# Patient Record
Sex: FEMALE | Race: WHITE | NOT HISPANIC OR LATINO | URBAN - METROPOLITAN AREA
[De-identification: names, ages, dates, MRNs, and addresses within clinical notes are randomized per-mention and may not be internally consistent; named-entity substitution may affect disease eponyms.]

---

## 2017-01-12 ENCOUNTER — OUTPATIENT (OUTPATIENT)
Dept: OUTPATIENT SERVICES | Facility: HOSPITAL | Age: 77
LOS: 1 days | End: 2017-01-12
Payer: MEDICARE

## 2017-01-12 DIAGNOSIS — Z90.710 ACQUIRED ABSENCE OF BOTH CERVIX AND UTERUS: Chronic | ICD-10-CM

## 2017-01-12 DIAGNOSIS — Z90.49 ACQUIRED ABSENCE OF OTHER SPECIFIED PARTS OF DIGESTIVE TRACT: Chronic | ICD-10-CM

## 2017-01-12 DIAGNOSIS — I67.1 CEREBRAL ANEURYSM, NONRUPTURED: Chronic | ICD-10-CM

## 2017-01-12 DIAGNOSIS — M54.16 RADICULOPATHY, LUMBAR REGION: ICD-10-CM

## 2017-01-12 DIAGNOSIS — Z98.89 OTHER SPECIFIED POSTPROCEDURAL STATES: Chronic | ICD-10-CM

## 2017-01-12 PROCEDURE — 62323 NJX INTERLAMINAR LMBR/SAC: CPT

## 2017-01-12 PROCEDURE — 77003 FLUOROGUIDE FOR SPINE INJECT: CPT

## 2017-01-19 ENCOUNTER — APPOINTMENT (OUTPATIENT)
Dept: CT IMAGING | Facility: HOSPITAL | Age: 77
End: 2017-01-19

## 2017-01-19 ENCOUNTER — OUTPATIENT (OUTPATIENT)
Dept: OUTPATIENT SERVICES | Facility: HOSPITAL | Age: 77
LOS: 1 days | End: 2017-01-19
Payer: MEDICARE

## 2017-01-19 DIAGNOSIS — Z98.89 OTHER SPECIFIED POSTPROCEDURAL STATES: Chronic | ICD-10-CM

## 2017-01-19 DIAGNOSIS — I67.1 CEREBRAL ANEURYSM, NONRUPTURED: ICD-10-CM

## 2017-01-19 DIAGNOSIS — Z90.49 ACQUIRED ABSENCE OF OTHER SPECIFIED PARTS OF DIGESTIVE TRACT: Chronic | ICD-10-CM

## 2017-01-19 DIAGNOSIS — I67.1 CEREBRAL ANEURYSM, NONRUPTURED: Chronic | ICD-10-CM

## 2017-01-19 DIAGNOSIS — Z90.710 ACQUIRED ABSENCE OF BOTH CERVIX AND UTERUS: Chronic | ICD-10-CM

## 2017-01-19 PROCEDURE — 70496 CT ANGIOGRAPHY HEAD: CPT

## 2017-01-19 PROCEDURE — 70496 CT ANGIOGRAPHY HEAD: CPT | Mod: 26,53

## 2017-01-25 ENCOUNTER — APPOINTMENT (OUTPATIENT)
Dept: NEUROSURGERY | Facility: CLINIC | Age: 77
End: 2017-01-25

## 2017-01-26 ENCOUNTER — OUTPATIENT (OUTPATIENT)
Dept: OUTPATIENT SERVICES | Facility: HOSPITAL | Age: 77
LOS: 1 days | End: 2017-01-26
Payer: MEDICARE

## 2017-01-26 DIAGNOSIS — M54.16 RADICULOPATHY, LUMBAR REGION: ICD-10-CM

## 2017-01-26 DIAGNOSIS — Z98.89 OTHER SPECIFIED POSTPROCEDURAL STATES: Chronic | ICD-10-CM

## 2017-01-26 DIAGNOSIS — Z90.49 ACQUIRED ABSENCE OF OTHER SPECIFIED PARTS OF DIGESTIVE TRACT: Chronic | ICD-10-CM

## 2017-01-26 DIAGNOSIS — Z90.710 ACQUIRED ABSENCE OF BOTH CERVIX AND UTERUS: Chronic | ICD-10-CM

## 2017-01-26 DIAGNOSIS — I67.1 CEREBRAL ANEURYSM, NONRUPTURED: Chronic | ICD-10-CM

## 2017-01-26 PROCEDURE — 62323 NJX INTERLAMINAR LMBR/SAC: CPT

## 2017-01-26 PROCEDURE — 77003 FLUOROGUIDE FOR SPINE INJECT: CPT

## 2017-02-08 ENCOUNTER — APPOINTMENT (OUTPATIENT)
Dept: NEUROSURGERY | Facility: CLINIC | Age: 77
End: 2017-02-08

## 2017-02-08 VITALS
DIASTOLIC BLOOD PRESSURE: 87 MMHG | HEART RATE: 65 BPM | OXYGEN SATURATION: 99 % | SYSTOLIC BLOOD PRESSURE: 150 MMHG | HEIGHT: 65 IN | RESPIRATION RATE: 16 BRPM

## 2017-03-02 ENCOUNTER — OUTPATIENT (OUTPATIENT)
Dept: OUTPATIENT SERVICES | Facility: HOSPITAL | Age: 77
LOS: 1 days | End: 2017-03-02
Payer: MEDICARE

## 2017-03-02 DIAGNOSIS — Z90.710 ACQUIRED ABSENCE OF BOTH CERVIX AND UTERUS: Chronic | ICD-10-CM

## 2017-03-02 DIAGNOSIS — I67.1 CEREBRAL ANEURYSM, NONRUPTURED: Chronic | ICD-10-CM

## 2017-03-02 DIAGNOSIS — Z98.89 OTHER SPECIFIED POSTPROCEDURAL STATES: Chronic | ICD-10-CM

## 2017-03-02 DIAGNOSIS — M54.16 RADICULOPATHY, LUMBAR REGION: ICD-10-CM

## 2017-03-02 DIAGNOSIS — Z90.49 ACQUIRED ABSENCE OF OTHER SPECIFIED PARTS OF DIGESTIVE TRACT: Chronic | ICD-10-CM

## 2017-03-02 PROCEDURE — 62323 NJX INTERLAMINAR LMBR/SAC: CPT

## 2017-03-02 PROCEDURE — 77003 FLUOROGUIDE FOR SPINE INJECT: CPT

## 2018-02-04 ENCOUNTER — FORM ENCOUNTER (OUTPATIENT)
Age: 78
End: 2018-02-04

## 2018-02-05 ENCOUNTER — OUTPATIENT (OUTPATIENT)
Dept: OUTPATIENT SERVICES | Facility: HOSPITAL | Age: 78
LOS: 1 days | End: 2018-02-05
Payer: MEDICARE

## 2018-02-05 ENCOUNTER — APPOINTMENT (OUTPATIENT)
Dept: CT IMAGING | Facility: HOSPITAL | Age: 78
End: 2018-02-05

## 2018-02-05 DIAGNOSIS — Z98.89 OTHER SPECIFIED POSTPROCEDURAL STATES: Chronic | ICD-10-CM

## 2018-02-05 DIAGNOSIS — I67.1 CEREBRAL ANEURYSM, NONRUPTURED: ICD-10-CM

## 2018-02-05 DIAGNOSIS — I67.1 CEREBRAL ANEURYSM, NONRUPTURED: Chronic | ICD-10-CM

## 2018-02-05 DIAGNOSIS — G93.9 DISORDER OF BRAIN, UNSPECIFIED: ICD-10-CM

## 2018-02-05 DIAGNOSIS — Z90.49 ACQUIRED ABSENCE OF OTHER SPECIFIED PARTS OF DIGESTIVE TRACT: Chronic | ICD-10-CM

## 2018-02-05 DIAGNOSIS — Z90.710 ACQUIRED ABSENCE OF BOTH CERVIX AND UTERUS: Chronic | ICD-10-CM

## 2018-02-05 PROCEDURE — 70496 CT ANGIOGRAPHY HEAD: CPT | Mod: 26

## 2018-02-05 PROCEDURE — 70496 CT ANGIOGRAPHY HEAD: CPT

## 2018-02-06 LAB
BUN SERPL-MCNC: 33 MG/DL
CREAT SERPL-MCNC: 0.92 MG/DL

## 2018-03-26 ENCOUNTER — OUTPATIENT (OUTPATIENT)
Dept: OUTPATIENT SERVICES | Facility: HOSPITAL | Age: 78
LOS: 1 days | End: 2018-03-26
Payer: MEDICARE

## 2018-03-26 VITALS
SYSTOLIC BLOOD PRESSURE: 139 MMHG | HEART RATE: 63 BPM | TEMPERATURE: 98 F | DIASTOLIC BLOOD PRESSURE: 88 MMHG | RESPIRATION RATE: 14 BRPM | OXYGEN SATURATION: 98 % | HEIGHT: 64 IN | WEIGHT: 136.91 LBS

## 2018-03-26 DIAGNOSIS — Z98.89 OTHER SPECIFIED POSTPROCEDURAL STATES: Chronic | ICD-10-CM

## 2018-03-26 DIAGNOSIS — Z90.49 ACQUIRED ABSENCE OF OTHER SPECIFIED PARTS OF DIGESTIVE TRACT: Chronic | ICD-10-CM

## 2018-03-26 DIAGNOSIS — I67.1 CEREBRAL ANEURYSM, NONRUPTURED: Chronic | ICD-10-CM

## 2018-03-26 DIAGNOSIS — M19.90 UNSPECIFIED OSTEOARTHRITIS, UNSPECIFIED SITE: ICD-10-CM

## 2018-03-26 DIAGNOSIS — Z01.818 ENCOUNTER FOR OTHER PREPROCEDURAL EXAMINATION: ICD-10-CM

## 2018-03-26 DIAGNOSIS — Z90.710 ACQUIRED ABSENCE OF BOTH CERVIX AND UTERUS: Chronic | ICD-10-CM

## 2018-03-26 DIAGNOSIS — M17.11 UNILATERAL PRIMARY OSTEOARTHRITIS, RIGHT KNEE: ICD-10-CM

## 2018-03-26 LAB
ANION GAP SERPL CALC-SCNC: 14 MMOL/L — SIGNIFICANT CHANGE UP (ref 5–17)
BLD GP AB SCN SERPL QL: NEGATIVE — SIGNIFICANT CHANGE UP
BUN SERPL-MCNC: 29 MG/DL — HIGH (ref 7–23)
CALCIUM SERPL-MCNC: 9.9 MG/DL — SIGNIFICANT CHANGE UP (ref 8.4–10.5)
CHLORIDE SERPL-SCNC: 98 MMOL/L — SIGNIFICANT CHANGE UP (ref 96–108)
CO2 SERPL-SCNC: 28 MMOL/L — SIGNIFICANT CHANGE UP (ref 22–31)
CREAT SERPL-MCNC: 0.87 MG/DL — SIGNIFICANT CHANGE UP (ref 0.5–1.3)
GLUCOSE SERPL-MCNC: 118 MG/DL — HIGH (ref 70–99)
HCT VFR BLD CALC: 40.2 % — SIGNIFICANT CHANGE UP (ref 34.5–45)
HGB BLD-MCNC: 13.2 G/DL — SIGNIFICANT CHANGE UP (ref 11.5–15.5)
MCHC RBC-ENTMCNC: 31.5 PG — SIGNIFICANT CHANGE UP (ref 27–34)
MCHC RBC-ENTMCNC: 32.8 GM/DL — SIGNIFICANT CHANGE UP (ref 32–36)
MCV RBC AUTO: 95.9 FL — SIGNIFICANT CHANGE UP (ref 80–100)
MRSA PCR RESULT.: SIGNIFICANT CHANGE UP
NRBC # BLD: 0 /100 WBCS — SIGNIFICANT CHANGE UP (ref 0–0)
PLATELET # BLD AUTO: 291 K/UL — SIGNIFICANT CHANGE UP (ref 150–400)
POTASSIUM SERPL-MCNC: 4.1 MMOL/L — SIGNIFICANT CHANGE UP (ref 3.5–5.3)
POTASSIUM SERPL-SCNC: 4.1 MMOL/L — SIGNIFICANT CHANGE UP (ref 3.5–5.3)
RBC # BLD: 4.19 M/UL — SIGNIFICANT CHANGE UP (ref 3.8–5.2)
RBC # FLD: 14 % — SIGNIFICANT CHANGE UP (ref 10.3–14.5)
RH IG SCN BLD-IMP: POSITIVE — SIGNIFICANT CHANGE UP
S AUREUS DNA NOSE QL NAA+PROBE: SIGNIFICANT CHANGE UP
SODIUM SERPL-SCNC: 140 MMOL/L — SIGNIFICANT CHANGE UP (ref 135–145)
WBC # BLD: 6.92 K/UL — SIGNIFICANT CHANGE UP (ref 3.8–10.5)
WBC # FLD AUTO: 6.92 K/UL — SIGNIFICANT CHANGE UP (ref 3.8–10.5)

## 2018-03-26 PROCEDURE — G0463: CPT

## 2018-03-26 PROCEDURE — 85027 COMPLETE CBC AUTOMATED: CPT

## 2018-03-26 PROCEDURE — 87640 STAPH A DNA AMP PROBE: CPT

## 2018-03-26 PROCEDURE — 86901 BLOOD TYPING SEROLOGIC RH(D): CPT

## 2018-03-26 PROCEDURE — 87641 MR-STAPH DNA AMP PROBE: CPT

## 2018-03-26 PROCEDURE — 86850 RBC ANTIBODY SCREEN: CPT

## 2018-03-26 PROCEDURE — 86900 BLOOD TYPING SEROLOGIC ABO: CPT

## 2018-03-26 PROCEDURE — 80048 BASIC METABOLIC PNL TOTAL CA: CPT

## 2018-03-26 RX ORDER — GABAPENTIN 400 MG/1
100 CAPSULE ORAL ONCE
Qty: 0 | Refills: 0 | Status: COMPLETED | OUTPATIENT
Start: 2018-04-10 | End: 2018-04-10

## 2018-03-26 RX ORDER — PANTOPRAZOLE SODIUM 20 MG/1
40 TABLET, DELAYED RELEASE ORAL ONCE
Qty: 0 | Refills: 0 | Status: COMPLETED | OUTPATIENT
Start: 2018-04-10 | End: 2018-04-10

## 2018-03-26 RX ORDER — CEFAZOLIN SODIUM 1 G
2000 VIAL (EA) INJECTION ONCE
Qty: 0 | Refills: 0 | Status: DISCONTINUED | OUTPATIENT
Start: 2018-04-10 | End: 2018-04-13

## 2018-03-26 RX ORDER — ACETAMINOPHEN 500 MG
975 TABLET ORAL ONCE
Qty: 0 | Refills: 0 | Status: COMPLETED | OUTPATIENT
Start: 2018-04-10 | End: 2018-04-10

## 2018-03-26 RX ORDER — SODIUM CHLORIDE 9 MG/ML
3 INJECTION INTRAMUSCULAR; INTRAVENOUS; SUBCUTANEOUS EVERY 8 HOURS
Qty: 0 | Refills: 0 | Status: DISCONTINUED | OUTPATIENT
Start: 2018-04-10 | End: 2018-04-10

## 2018-03-26 RX ORDER — LOSARTAN/HYDROCHLOROTHIAZIDE 100MG-25MG
1 TABLET ORAL
Qty: 0 | Refills: 0 | COMMUNITY

## 2018-03-26 RX ORDER — LIDOCAINE HCL 20 MG/ML
0.2 VIAL (ML) INJECTION ONCE
Qty: 0 | Refills: 0 | Status: DISCONTINUED | OUTPATIENT
Start: 2018-04-10 | End: 2018-04-10

## 2018-03-26 RX ORDER — TRAMADOL HYDROCHLORIDE 50 MG/1
50 TABLET ORAL ONCE
Qty: 0 | Refills: 0 | Status: COMPLETED | OUTPATIENT
Start: 2018-04-10 | End: 2018-04-10

## 2018-03-26 NOTE — H&P PST ADULT - PSH
Cerebral aneurysm  11/1994 - left frontal temporal craniotomy and clipping of anterior communicating artery aneurysm  S/P appendectomy  1977  S/P cholecystectomy  14 years ago  S/P hysterectomy  1958 - menorrhagia

## 2018-03-26 NOTE — H&P PST ADULT - HISTORY OF PRESENT ILLNESS
77 yo female with h/o hypothyroidism and osteoarthritis of the right knee with c/o worsening right knee pain and difficulty walking. She is scheduled for right TKR on 4/10/2018.     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 CTA of brain 2/5/2018 revealed slight increase in A1A2 junction aneurysm, from 3x4mm to 6x4mm. Pt was instructed by neurosurgeon to follow up with repeat scan in six months (August 2018). Pt denies headache or visual disturbance.

## 2018-03-26 NOTE — H&P PST ADULT - PMH
Cerebral aneurysm    Hyperlipemia    Osteoarthritis Cerebral aneurysm  unruptured  Hyperlipemia    Hypothyroidism    Osteoarthritis

## 2018-03-26 NOTE — H&P PST ADULT - NSANTHOSAYNRD_GEN_A_CORE
No. GILDARDO screening performed.  STOP BANG Legend: 0-2 = LOW Risk; 3-4 = INTERMEDIATE Risk; 5-8 = HIGH Risk No. GILDARDO screening performed.  STOP BANG Legend: 0-2 = LOW Risk; 3-4 = INTERMEDIATE Risk; 5-8 = HIGH Risk/13.5 inches - neck

## 2018-04-10 ENCOUNTER — RESULT REVIEW (OUTPATIENT)
Age: 78
End: 2018-04-10

## 2018-04-10 ENCOUNTER — INPATIENT (INPATIENT)
Facility: HOSPITAL | Age: 78
LOS: 2 days | Discharge: NURSING, REHAB OR LONG-TERM CARE | DRG: 470 | End: 2018-04-13
Attending: ORTHOPAEDIC SURGERY | Admitting: ORTHOPAEDIC SURGERY
Payer: MEDICARE

## 2018-04-10 VITALS
DIASTOLIC BLOOD PRESSURE: 54 MMHG | HEART RATE: 59 BPM | SYSTOLIC BLOOD PRESSURE: 110 MMHG | OXYGEN SATURATION: 100 % | TEMPERATURE: 97 F | RESPIRATION RATE: 14 BRPM

## 2018-04-10 DIAGNOSIS — M17.11 UNILATERAL PRIMARY OSTEOARTHRITIS, RIGHT KNEE: ICD-10-CM

## 2018-04-10 DIAGNOSIS — Z90.710 ACQUIRED ABSENCE OF BOTH CERVIX AND UTERUS: Chronic | ICD-10-CM

## 2018-04-10 DIAGNOSIS — Z90.49 ACQUIRED ABSENCE OF OTHER SPECIFIED PARTS OF DIGESTIVE TRACT: Chronic | ICD-10-CM

## 2018-04-10 DIAGNOSIS — I67.1 CEREBRAL ANEURYSM, NONRUPTURED: Chronic | ICD-10-CM

## 2018-04-10 DIAGNOSIS — Z98.89 OTHER SPECIFIED POSTPROCEDURAL STATES: Chronic | ICD-10-CM

## 2018-04-10 LAB
ANION GAP SERPL CALC-SCNC: 12 MMOL/L — SIGNIFICANT CHANGE UP (ref 5–17)
BUN SERPL-MCNC: 29 MG/DL — HIGH (ref 7–23)
CALCIUM SERPL-MCNC: 8.7 MG/DL — SIGNIFICANT CHANGE UP (ref 8.4–10.5)
CHLORIDE SERPL-SCNC: 104 MMOL/L — SIGNIFICANT CHANGE UP (ref 96–108)
CO2 SERPL-SCNC: 27 MMOL/L — SIGNIFICANT CHANGE UP (ref 22–31)
CREAT SERPL-MCNC: 0.84 MG/DL — SIGNIFICANT CHANGE UP (ref 0.5–1.3)
GLUCOSE BLDC GLUCOMTR-MCNC: 101 MG/DL — HIGH (ref 70–99)
GLUCOSE SERPL-MCNC: 107 MG/DL — HIGH (ref 70–99)
HCT VFR BLD CALC: 35.9 % — SIGNIFICANT CHANGE UP (ref 34.5–45)
HGB BLD-MCNC: 12.3 G/DL — SIGNIFICANT CHANGE UP (ref 11.5–15.5)
MCHC RBC-ENTMCNC: 32.6 PG — SIGNIFICANT CHANGE UP (ref 27–34)
MCHC RBC-ENTMCNC: 34.3 GM/DL — SIGNIFICANT CHANGE UP (ref 32–36)
MCV RBC AUTO: 94.8 FL — SIGNIFICANT CHANGE UP (ref 80–100)
PLATELET # BLD AUTO: 248 K/UL — SIGNIFICANT CHANGE UP (ref 150–400)
POTASSIUM SERPL-MCNC: 3.9 MMOL/L — SIGNIFICANT CHANGE UP (ref 3.5–5.3)
POTASSIUM SERPL-SCNC: 3.9 MMOL/L — SIGNIFICANT CHANGE UP (ref 3.5–5.3)
RBC # BLD: 3.79 M/UL — LOW (ref 3.8–5.2)
RBC # FLD: 12.1 % — SIGNIFICANT CHANGE UP (ref 10.3–14.5)
SODIUM SERPL-SCNC: 143 MMOL/L — SIGNIFICANT CHANGE UP (ref 135–145)
WBC # BLD: 8.2 K/UL — SIGNIFICANT CHANGE UP (ref 3.8–10.5)
WBC # FLD AUTO: 8.2 K/UL — SIGNIFICANT CHANGE UP (ref 3.8–10.5)

## 2018-04-10 PROCEDURE — 88311 DECALCIFY TISSUE: CPT | Mod: 26

## 2018-04-10 PROCEDURE — 88305 TISSUE EXAM BY PATHOLOGIST: CPT | Mod: 26

## 2018-04-10 PROCEDURE — 73560 X-RAY EXAM OF KNEE 1 OR 2: CPT | Mod: 26,RT

## 2018-04-10 RX ORDER — OXYCODONE HYDROCHLORIDE 5 MG/1
10 TABLET ORAL EVERY 4 HOURS
Qty: 0 | Refills: 0 | Status: DISCONTINUED | OUTPATIENT
Start: 2018-04-10 | End: 2018-04-10

## 2018-04-10 RX ORDER — DOCUSATE SODIUM 100 MG
100 CAPSULE ORAL THREE TIMES A DAY
Qty: 0 | Refills: 0 | Status: DISCONTINUED | OUTPATIENT
Start: 2018-04-10 | End: 2018-04-10

## 2018-04-10 RX ORDER — LOSARTAN/HYDROCHLOROTHIAZIDE 100MG-25MG
1 TABLET ORAL
Qty: 0 | Refills: 0 | COMMUNITY

## 2018-04-10 RX ORDER — ACETAMINOPHEN 500 MG
1000 TABLET ORAL ONCE
Qty: 0 | Refills: 0 | Status: COMPLETED | OUTPATIENT
Start: 2018-04-10 | End: 2018-04-10

## 2018-04-10 RX ORDER — ASCORBIC ACID 60 MG
1 TABLET,CHEWABLE ORAL
Qty: 0 | Refills: 0 | COMMUNITY

## 2018-04-10 RX ORDER — SODIUM CHLORIDE 9 MG/ML
1000 INJECTION INTRAMUSCULAR; INTRAVENOUS; SUBCUTANEOUS ONCE
Qty: 0 | Refills: 0 | Status: COMPLETED | OUTPATIENT
Start: 2018-04-11 | End: 2018-04-11

## 2018-04-10 RX ORDER — THYROID 120 MG
1 TABLET ORAL
Qty: 0 | Refills: 0 | COMMUNITY

## 2018-04-10 RX ORDER — SENNA PLUS 8.6 MG/1
2 TABLET ORAL AT BEDTIME
Qty: 0 | Refills: 0 | Status: DISCONTINUED | OUTPATIENT
Start: 2018-04-10 | End: 2018-04-10

## 2018-04-10 RX ORDER — PANTOPRAZOLE SODIUM 20 MG/1
40 TABLET, DELAYED RELEASE ORAL DAILY
Qty: 0 | Refills: 0 | Status: DISCONTINUED | OUTPATIENT
Start: 2018-04-10 | End: 2018-04-13

## 2018-04-10 RX ORDER — DOCUSATE SODIUM 100 MG
100 CAPSULE ORAL THREE TIMES A DAY
Qty: 0 | Refills: 0 | Status: DISCONTINUED | OUTPATIENT
Start: 2018-04-10 | End: 2018-04-13

## 2018-04-10 RX ORDER — HYDROCHLOROTHIAZIDE 25 MG
12.5 TABLET ORAL DAILY
Qty: 0 | Refills: 0 | Status: DISCONTINUED | OUTPATIENT
Start: 2018-04-10 | End: 2018-04-13

## 2018-04-10 RX ORDER — MAGNESIUM HYDROXIDE 400 MG/1
30 TABLET, CHEWABLE ORAL DAILY
Qty: 0 | Refills: 0 | Status: DISCONTINUED | OUTPATIENT
Start: 2018-04-10 | End: 2018-04-10

## 2018-04-10 RX ORDER — ASPIRIN/CALCIUM CARB/MAGNESIUM 324 MG
325 TABLET ORAL
Qty: 0 | Refills: 0 | Status: DISCONTINUED | OUTPATIENT
Start: 2018-04-10 | End: 2018-04-10

## 2018-04-10 RX ORDER — GABAPENTIN 400 MG/1
100 CAPSULE ORAL EVERY 8 HOURS
Qty: 0 | Refills: 0 | Status: DISCONTINUED | OUTPATIENT
Start: 2018-04-10 | End: 2018-04-10

## 2018-04-10 RX ORDER — TRAMADOL HYDROCHLORIDE 50 MG/1
50 TABLET ORAL EVERY 8 HOURS
Qty: 0 | Refills: 0 | Status: DISCONTINUED | OUTPATIENT
Start: 2018-04-10 | End: 2018-04-13

## 2018-04-10 RX ORDER — SENNA PLUS 8.6 MG/1
2 TABLET ORAL AT BEDTIME
Qty: 0 | Refills: 0 | Status: DISCONTINUED | OUTPATIENT
Start: 2018-04-10 | End: 2018-04-13

## 2018-04-10 RX ORDER — FAMOTIDINE 10 MG/ML
20 INJECTION INTRAVENOUS EVERY 12 HOURS
Qty: 0 | Refills: 0 | Status: DISCONTINUED | OUTPATIENT
Start: 2018-04-10 | End: 2018-04-10

## 2018-04-10 RX ORDER — ONDANSETRON 8 MG/1
4 TABLET, FILM COATED ORAL EVERY 6 HOURS
Qty: 0 | Refills: 0 | Status: DISCONTINUED | OUTPATIENT
Start: 2018-04-10 | End: 2018-04-13

## 2018-04-10 RX ORDER — MORPHINE SULFATE 50 MG/1
4 CAPSULE, EXTENDED RELEASE ORAL ONCE
Qty: 0 | Refills: 0 | Status: DISCONTINUED | OUTPATIENT
Start: 2018-04-10 | End: 2018-04-10

## 2018-04-10 RX ORDER — MAGNESIUM HYDROXIDE 400 MG/1
30 TABLET, CHEWABLE ORAL DAILY
Qty: 0 | Refills: 0 | Status: DISCONTINUED | OUTPATIENT
Start: 2018-04-10 | End: 2018-04-13

## 2018-04-10 RX ORDER — LOSARTAN POTASSIUM 100 MG/1
50 TABLET, FILM COATED ORAL DAILY
Qty: 0 | Refills: 0 | Status: DISCONTINUED | OUTPATIENT
Start: 2018-04-10 | End: 2018-04-13

## 2018-04-10 RX ORDER — THYROID 120 MG
30 TABLET ORAL DAILY
Qty: 0 | Refills: 0 | Status: DISCONTINUED | OUTPATIENT
Start: 2018-04-10 | End: 2018-04-13

## 2018-04-10 RX ORDER — GABAPENTIN 400 MG/1
100 CAPSULE ORAL EVERY 8 HOURS
Qty: 0 | Refills: 0 | Status: DISCONTINUED | OUTPATIENT
Start: 2018-04-10 | End: 2018-04-13

## 2018-04-10 RX ORDER — SODIUM CHLORIDE 9 MG/ML
1000 INJECTION, SOLUTION INTRAVENOUS
Qty: 0 | Refills: 0 | Status: DISCONTINUED | OUTPATIENT
Start: 2018-04-10 | End: 2018-04-10

## 2018-04-10 RX ORDER — OXYCODONE HYDROCHLORIDE 5 MG/1
10 TABLET ORAL EVERY 4 HOURS
Qty: 0 | Refills: 0 | Status: DISCONTINUED | OUTPATIENT
Start: 2018-04-10 | End: 2018-04-13

## 2018-04-10 RX ORDER — SODIUM CHLORIDE 9 MG/ML
1000 INJECTION, SOLUTION INTRAVENOUS
Qty: 0 | Refills: 0 | Status: DISCONTINUED | OUTPATIENT
Start: 2018-04-10 | End: 2018-04-13

## 2018-04-10 RX ORDER — OXYCODONE HYDROCHLORIDE 5 MG/1
5 TABLET ORAL EVERY 4 HOURS
Qty: 0 | Refills: 0 | Status: DISCONTINUED | OUTPATIENT
Start: 2018-04-10 | End: 2018-04-13

## 2018-04-10 RX ORDER — ONDANSETRON 8 MG/1
4 TABLET, FILM COATED ORAL EVERY 6 HOURS
Qty: 0 | Refills: 0 | Status: DISCONTINUED | OUTPATIENT
Start: 2018-04-10 | End: 2018-04-10

## 2018-04-10 RX ORDER — CEFAZOLIN SODIUM 1 G
2000 VIAL (EA) INJECTION EVERY 8 HOURS
Qty: 0 | Refills: 0 | Status: DISCONTINUED | OUTPATIENT
Start: 2018-04-10 | End: 2018-04-10

## 2018-04-10 RX ORDER — KETOROLAC TROMETHAMINE 30 MG/ML
15 SYRINGE (ML) INJECTION EVERY 8 HOURS
Qty: 0 | Refills: 0 | Status: DISCONTINUED | OUTPATIENT
Start: 2018-04-10 | End: 2018-04-10

## 2018-04-10 RX ORDER — OXYCODONE HYDROCHLORIDE 5 MG/1
5 TABLET ORAL EVERY 4 HOURS
Qty: 0 | Refills: 0 | Status: DISCONTINUED | OUTPATIENT
Start: 2018-04-10 | End: 2018-04-10

## 2018-04-10 RX ORDER — POLYETHYLENE GLYCOL 3350 17 G/17G
17 POWDER, FOR SOLUTION ORAL DAILY
Qty: 0 | Refills: 0 | Status: DISCONTINUED | OUTPATIENT
Start: 2018-04-10 | End: 2018-04-10

## 2018-04-10 RX ORDER — ACETAMINOPHEN 500 MG
650 TABLET ORAL EVERY 6 HOURS
Qty: 0 | Refills: 0 | Status: DISCONTINUED | OUTPATIENT
Start: 2018-04-10 | End: 2018-04-13

## 2018-04-10 RX ORDER — TRAMADOL HYDROCHLORIDE 50 MG/1
50 TABLET ORAL EVERY 8 HOURS
Qty: 0 | Refills: 0 | Status: DISCONTINUED | OUTPATIENT
Start: 2018-04-10 | End: 2018-04-10

## 2018-04-10 RX ORDER — MORPHINE SULFATE 50 MG/1
4 CAPSULE, EXTENDED RELEASE ORAL ONCE
Qty: 0 | Refills: 0 | Status: DISCONTINUED | OUTPATIENT
Start: 2018-04-10 | End: 2018-04-13

## 2018-04-10 RX ORDER — HYDROMORPHONE HYDROCHLORIDE 2 MG/ML
0.25 INJECTION INTRAMUSCULAR; INTRAVENOUS; SUBCUTANEOUS
Qty: 0 | Refills: 0 | Status: DISCONTINUED | OUTPATIENT
Start: 2018-04-10 | End: 2018-04-10

## 2018-04-10 RX ORDER — ACETAMINOPHEN 500 MG
650 TABLET ORAL EVERY 6 HOURS
Qty: 0 | Refills: 0 | Status: DISCONTINUED | OUTPATIENT
Start: 2018-04-10 | End: 2018-04-10

## 2018-04-10 RX ORDER — ASPIRIN/CALCIUM CARB/MAGNESIUM 324 MG
325 TABLET ORAL
Qty: 0 | Refills: 0 | Status: DISCONTINUED | OUTPATIENT
Start: 2018-04-10 | End: 2018-04-13

## 2018-04-10 RX ORDER — CEFAZOLIN SODIUM 1 G
2000 VIAL (EA) INJECTION EVERY 8 HOURS
Qty: 0 | Refills: 0 | Status: COMPLETED | OUTPATIENT
Start: 2018-04-10 | End: 2018-04-10

## 2018-04-10 RX ORDER — POLYETHYLENE GLYCOL 3350 17 G/17G
17 POWDER, FOR SOLUTION ORAL DAILY
Qty: 0 | Refills: 0 | Status: DISCONTINUED | OUTPATIENT
Start: 2018-04-10 | End: 2018-04-13

## 2018-04-10 RX ORDER — SODIUM CHLORIDE 9 MG/ML
500 INJECTION INTRAMUSCULAR; INTRAVENOUS; SUBCUTANEOUS ONCE
Qty: 0 | Refills: 0 | Status: COMPLETED | OUTPATIENT
Start: 2018-04-10 | End: 2018-04-10

## 2018-04-10 RX ORDER — SODIUM CHLORIDE 9 MG/ML
1000 INJECTION INTRAMUSCULAR; INTRAVENOUS; SUBCUTANEOUS ONCE
Qty: 0 | Refills: 0 | Status: COMPLETED | OUTPATIENT
Start: 2018-04-10 | End: 2018-04-10

## 2018-04-10 RX ADMIN — PANTOPRAZOLE SODIUM 40 MILLIGRAM(S): 20 TABLET, DELAYED RELEASE ORAL at 07:31

## 2018-04-10 RX ADMIN — GABAPENTIN 100 MILLIGRAM(S): 400 CAPSULE ORAL at 22:41

## 2018-04-10 RX ADMIN — Medication 975 MILLIGRAM(S): at 07:31

## 2018-04-10 RX ADMIN — Medication 100 MILLIGRAM(S): at 22:41

## 2018-04-10 RX ADMIN — Medication 325 MILLIGRAM(S): at 17:49

## 2018-04-10 RX ADMIN — ONDANSETRON 4 MILLIGRAM(S): 8 TABLET, FILM COATED ORAL at 18:35

## 2018-04-10 RX ADMIN — GABAPENTIN 100 MILLIGRAM(S): 400 CAPSULE ORAL at 07:31

## 2018-04-10 RX ADMIN — Medication 1000 MILLIGRAM(S): at 19:15

## 2018-04-10 RX ADMIN — Medication 400 MILLIGRAM(S): at 18:55

## 2018-04-10 RX ADMIN — Medication 100 MILLIGRAM(S): at 16:08

## 2018-04-10 RX ADMIN — TRAMADOL HYDROCHLORIDE 50 MILLIGRAM(S): 50 TABLET ORAL at 22:44

## 2018-04-10 RX ADMIN — HYDROMORPHONE HYDROCHLORIDE 0.25 MILLIGRAM(S): 2 INJECTION INTRAMUSCULAR; INTRAVENOUS; SUBCUTANEOUS at 15:21

## 2018-04-10 RX ADMIN — SODIUM CHLORIDE 1000 MILLILITER(S): 9 INJECTION INTRAMUSCULAR; INTRAVENOUS; SUBCUTANEOUS at 20:02

## 2018-04-10 RX ADMIN — SODIUM CHLORIDE 75 MILLILITER(S): 9 INJECTION, SOLUTION INTRAVENOUS at 12:11

## 2018-04-10 RX ADMIN — SODIUM CHLORIDE 2000 MILLILITER(S): 9 INJECTION INTRAMUSCULAR; INTRAVENOUS; SUBCUTANEOUS at 16:56

## 2018-04-10 RX ADMIN — TRAMADOL HYDROCHLORIDE 50 MILLIGRAM(S): 50 TABLET ORAL at 23:15

## 2018-04-10 RX ADMIN — HYDROMORPHONE HYDROCHLORIDE 0.25 MILLIGRAM(S): 2 INJECTION INTRAMUSCULAR; INTRAVENOUS; SUBCUTANEOUS at 16:11

## 2018-04-10 NOTE — BRIEF OPERATIVE NOTE - PROCEDURE
<<-----Click on this checkbox to enter Procedure Total knee arthroplasty  04/10/2018  R TKA  Active  [id number]

## 2018-04-10 NOTE — PHYSICAL THERAPY INITIAL EVALUATION ADULT - ADDITIONAL COMMENTS
Pt lives in a  alone, pt reports she will be staying with her daughter temporarily after surgery. +3 steps to enter with HR. All needs met on main floor. Pt reports she was ambulating independently without use of an AD and was independent with ADL's prior. Pt lives in a  alone, pt reports she will be staying with her daughter temporarily after surgery. +3 steps to enter with no HR. All needs met on main floor. Pt reports she was ambulating independently without use of an AD and was independent with ADL's prior.

## 2018-04-10 NOTE — PHYSICAL THERAPY INITIAL EVALUATION ADULT - PRECAUTIONS/LIMITATIONS, REHAB EVAL
fall precautions/surgical precautions fall precautions/surgical precautions/which is followed annually with imaging. CTA of brain 2/5/2018 revealed slight increase in A1A2 junction aneurysm, from 3x4mm to 6x4mm. Pt was instructed by neurosurgeon to follow up with repeat scan in six months (August 2018).

## 2018-04-10 NOTE — CHART NOTE - NSCHARTNOTEFT_GEN_A_CORE
Resting without complaints.  No Chest Pain, SOB, N/V.    T(C): 36 (04-10-18 @ 11:35), Max: 36 (04-10-18 @ 11:35)  HR: 54 (04-10-18 @ 12:30) (54 - 61)  BP: 115/59 (04-10-18 @ 12:30) (101/52 - 115/59)  RR: 15 (04-10-18 @ 12:30) (13 - 15)  SpO2: 100% (04-10-18 @ 12:30) (100% - 100%)  Wt(kg): --    Exam:  Alert and [place], No Acute Distress  Card: +S1/S2, RRR  Pulm: CTAB  RLE dsg c/d/i with soft/compressible compartments  Calves soft, non-tender bilaterally  Limited motor 2/2 spinal  SILT, decreased in foot 2/2 spinal  + DP pulse    Xray: Intact R knee hw                          12.3   8.2   )-----------( 248      ( 10 Apr 2018 12:09 )             35.9    04-10    143  |  104  |  29<H>  ----------------------------<  107<H>  3.9   |  27  |  0.84    Ca    8.7      10 Apr 2018 12:09        A/P: 78yFemale S/p R Total Knee Arthroplasty. VSS. NAD.  Limited exam 2/2 spinal block  -PT/OT-WBAT   -re-eval when block d/c'd  -IS  -DVT PPx  -Pain Control  -Continue Current Tx    Isaiah Car PA-C  Team Pager #[phone]

## 2018-04-10 NOTE — PHYSICAL THERAPY INITIAL EVALUATION ADULT - TRANSFER TRAINING, PT EVAL
GOAL: Pt will perform ALL transfers independently, w/use of appropriate assistive device as needed, in 2 weeks.

## 2018-04-10 NOTE — PHYSICAL THERAPY INITIAL EVALUATION ADULT - ACTIVE RANGE OF MOTION EXAMINATION, REHAB EVAL
no Active ROM deficits were identified/Left LE Active ROM was WFL (within functional limits)/R LE AROM limited 2/2 pain

## 2018-04-10 NOTE — PHYSICAL THERAPY INITIAL EVALUATION ADULT - STRENGTHENING, PT EVAL
GOAL: Pt will improve R LE strength to 4/5, for increased limb stability, to improve gait and facilitate stair negotiation in 2 weeks.

## 2018-04-10 NOTE — PHYSICAL THERAPY INITIAL EVALUATION ADULT - PERTINENT HX OF CURRENT PROBLEM, REHAB EVAL
77 yo female with h/o hypothyroidism and osteoarthritis of the right knee with c/o worsening right knee pain and difficulty walking. Pt is now s/p right TKR on 4/10/2018. Pt with h/o unruptured acomm artery aneurysm s/p clipping 1994 and distal left A1A2 junction aneurysm,

## 2018-04-10 NOTE — PHYSICAL THERAPY INITIAL EVALUATION ADULT - BALANCE TRAINING, PT EVAL
GOAL: Pt will demonstrate improved static/dynamic standing balance to good, in order to improve stability, decrease fall risk and increase independence with ADLs within 4 weeks.

## 2018-04-10 NOTE — PHYSICAL THERAPY INITIAL EVALUATION ADULT - PLANNED THERAPY INTERVENTIONS, PT EVAL
strengthening/transfer training/balance training/stair negotiation: GOAL pt will ascend/descend 5 steps independently with use of straight cane and no handrail with step to pattern in 2 weeks/bed mobility training/gait training

## 2018-04-11 LAB
ANION GAP SERPL CALC-SCNC: 10 MMOL/L — SIGNIFICANT CHANGE UP (ref 5–17)
BUN SERPL-MCNC: 28 MG/DL — HIGH (ref 7–23)
CALCIUM SERPL-MCNC: 8.6 MG/DL — SIGNIFICANT CHANGE UP (ref 8.4–10.5)
CHLORIDE SERPL-SCNC: 102 MMOL/L — SIGNIFICANT CHANGE UP (ref 96–108)
CO2 SERPL-SCNC: 26 MMOL/L — SIGNIFICANT CHANGE UP (ref 22–31)
CREAT SERPL-MCNC: 0.75 MG/DL — SIGNIFICANT CHANGE UP (ref 0.5–1.3)
GLUCOSE SERPL-MCNC: 121 MG/DL — HIGH (ref 70–99)
HCT VFR BLD CALC: 34.1 % — LOW (ref 34.5–45)
HGB BLD-MCNC: 11.1 G/DL — LOW (ref 11.5–15.5)
MCHC RBC-ENTMCNC: 31.5 PG — SIGNIFICANT CHANGE UP (ref 27–34)
MCHC RBC-ENTMCNC: 32.6 GM/DL — SIGNIFICANT CHANGE UP (ref 32–36)
MCV RBC AUTO: 96.9 FL — SIGNIFICANT CHANGE UP (ref 80–100)
PLATELET # BLD AUTO: 228 K/UL — SIGNIFICANT CHANGE UP (ref 150–400)
POTASSIUM SERPL-MCNC: 3.6 MMOL/L — SIGNIFICANT CHANGE UP (ref 3.5–5.3)
POTASSIUM SERPL-SCNC: 3.6 MMOL/L — SIGNIFICANT CHANGE UP (ref 3.5–5.3)
RBC # BLD: 3.52 M/UL — LOW (ref 3.8–5.2)
RBC # FLD: 14.2 % — SIGNIFICANT CHANGE UP (ref 10.3–14.5)
SODIUM SERPL-SCNC: 138 MMOL/L — SIGNIFICANT CHANGE UP (ref 135–145)
WBC # BLD: 9.27 K/UL — SIGNIFICANT CHANGE UP (ref 3.8–10.5)
WBC # FLD AUTO: 9.27 K/UL — SIGNIFICANT CHANGE UP (ref 3.8–10.5)

## 2018-04-11 RX ADMIN — GABAPENTIN 100 MILLIGRAM(S): 400 CAPSULE ORAL at 06:09

## 2018-04-11 RX ADMIN — PANTOPRAZOLE SODIUM 40 MILLIGRAM(S): 20 TABLET, DELAYED RELEASE ORAL at 12:35

## 2018-04-11 RX ADMIN — ONDANSETRON 4 MILLIGRAM(S): 8 TABLET, FILM COATED ORAL at 00:50

## 2018-04-11 RX ADMIN — Medication 100 MILLIGRAM(S): at 14:24

## 2018-04-11 RX ADMIN — SODIUM CHLORIDE 2000 MILLILITER(S): 9 INJECTION INTRAMUSCULAR; INTRAVENOUS; SUBCUTANEOUS at 06:10

## 2018-04-11 RX ADMIN — Medication 100 MILLIGRAM(S): at 21:32

## 2018-04-11 RX ADMIN — TRAMADOL HYDROCHLORIDE 50 MILLIGRAM(S): 50 TABLET ORAL at 14:50

## 2018-04-11 RX ADMIN — GABAPENTIN 100 MILLIGRAM(S): 400 CAPSULE ORAL at 14:24

## 2018-04-11 RX ADMIN — TRAMADOL HYDROCHLORIDE 50 MILLIGRAM(S): 50 TABLET ORAL at 06:09

## 2018-04-11 RX ADMIN — TRAMADOL HYDROCHLORIDE 50 MILLIGRAM(S): 50 TABLET ORAL at 22:00

## 2018-04-11 RX ADMIN — LOSARTAN POTASSIUM 50 MILLIGRAM(S): 100 TABLET, FILM COATED ORAL at 06:09

## 2018-04-11 RX ADMIN — OXYCODONE HYDROCHLORIDE 10 MILLIGRAM(S): 5 TABLET ORAL at 09:35

## 2018-04-11 RX ADMIN — TRAMADOL HYDROCHLORIDE 50 MILLIGRAM(S): 50 TABLET ORAL at 21:32

## 2018-04-11 RX ADMIN — Medication 325 MILLIGRAM(S): at 18:09

## 2018-04-11 RX ADMIN — GABAPENTIN 100 MILLIGRAM(S): 400 CAPSULE ORAL at 21:32

## 2018-04-11 RX ADMIN — Medication 12.5 MILLIGRAM(S): at 06:09

## 2018-04-11 RX ADMIN — OXYCODONE HYDROCHLORIDE 10 MILLIGRAM(S): 5 TABLET ORAL at 09:05

## 2018-04-11 RX ADMIN — TRAMADOL HYDROCHLORIDE 50 MILLIGRAM(S): 50 TABLET ORAL at 14:24

## 2018-04-11 RX ADMIN — Medication 325 MILLIGRAM(S): at 06:09

## 2018-04-11 RX ADMIN — ONDANSETRON 4 MILLIGRAM(S): 8 TABLET, FILM COATED ORAL at 06:41

## 2018-04-11 RX ADMIN — Medication 100 MILLIGRAM(S): at 06:09

## 2018-04-11 RX ADMIN — POLYETHYLENE GLYCOL 3350 17 GRAM(S): 17 POWDER, FOR SOLUTION ORAL at 12:35

## 2018-04-11 RX ADMIN — Medication 30 MILLIGRAM(S): at 08:09

## 2018-04-11 NOTE — OCCUPATIONAL THERAPY INITIAL EVALUATION ADULT - PERTINENT HX OF CURRENT PROBLEM, REHAB EVAL
77 yo female with h/o hypothyroidism and osteoarthritis of the right knee with c/o worsening right knee pain and difficulty walking. She is scheduled for right TKR on 4/10/2018.

## 2018-04-11 NOTE — PROGRESS NOTE ADULT - SUBJECTIVE AND OBJECTIVE BOX
ORTHO PROGRESS NOTE     Pt seen and examined at bedside, denies SOB, CP, Dizziness. N/V/D /HA.  No significant overnight events. Pain well controlled. Patient ambulated  with PT     Vital Signs Last 24 Hrs  T(C): 36.7 (11 Apr 2018 05:51), Max: 36.7 (11 Apr 2018 05:51)  T(F): 98 (11 Apr 2018 05:51), Max: 98 (11 Apr 2018 05:51)  HR: 64 (11 Apr 2018 05:51) (53 - 74)  BP: 122/71 (11 Apr 2018 05:51) (101/52 - 137/70)  BP(mean): 84 (10 Apr 2018 16:00) (0 - 84)  RR: 18 (11 Apr 2018 05:51) (13 - 18)  SpO2: 99% (11 Apr 2018 05:51) (97% - 100%)    Gen: No apparent distress, alert    RLE:  Dressing C/D/I;           +DF/PF. moving toes          SILT, wwp at foot          compartments soft    Labs:  CBC Full  -  ( 10 Apr 2018 12:09 )  WBC Count : 8.2 K/uL  Hemoglobin : 12.3 g/dL  Hematocrit : 35.9 %  Platelet Count - Automated : 248 K/uL  Mean Cell Volume : 94.8 fl  Mean Cell Hemoglobin : 32.6 pg  Mean Cell Hemoglobin Concentration : 34.3 gm/dL  Auto Neutrophil # : x  Auto Lymphocyte # : x  Auto Monocyte # : x  Auto Eosinophil # : x  Auto Basophil # : x  Auto Neutrophil % : x  Auto Lymphocyte % : x  Auto Monocyte % : x  Auto Eosinophil % : x  Auto Basophil % : x      04-10    143  |  104  |  29<H>  ----------------------------<  107<H>  3.9   |  27  |  0.84    Ca    8.7      10 Apr 2018 12:09        A/P  Pt is a 78y yo Female s/p R  Knee Arthroplasty    - Pain control/ Analgesia  - DVT ppx  - PT/OT - wbat/oob    - WBAT

## 2018-04-11 NOTE — OCCUPATIONAL THERAPY INITIAL EVALUATION ADULT - DIAGNOSIS, OT EVAL
Decreased balance, strength, endurance, increased pain/confusion impacting ability to perform ADLs and functional mobility.

## 2018-04-11 NOTE — OCCUPATIONAL THERAPY INITIAL EVALUATION ADULT - TRANSFER SAFETY CONCERNS NOTED: BED/CHAIR, REHAB EVAL
decreased weight-shifting ability/decreased balance during turns/decreased safety awareness/losing balance

## 2018-04-11 NOTE — OCCUPATIONAL THERAPY INITIAL EVALUATION ADULT - TRANSFER TRAINING, PT EVAL
Patient will perform sit to stand independently within two weeks. Patient will be independent with functional transfer with use of rolling walker  in two weeks

## 2018-04-11 NOTE — OCCUPATIONAL THERAPY INITIAL EVALUATION ADULT - ADL RETRAINING, OT EVAL
Pt will perform LB dressing independently in two weeks. Pt will be independent with toileting in two weeks

## 2018-04-11 NOTE — PROVIDER CONTACT NOTE (OTHER) - ASSESSMENT
Pt denies allergies, no new medications for foods given today. Pt denies chest pain, SOB, difficulty breathing. No rash noted elsewhere on body. Rash flat, patchy. Pt states she hasn't noticed a rash like this before, and family agrees

## 2018-04-11 NOTE — PROGRESS NOTE ADULT - SUBJECTIVE AND OBJECTIVE BOX
YVONNE GUZMAN  [id number]  04-11-18 @ 08:11      S:s/p TKR. The patient reports pain control is fair.    O: The patient is alert. Examination shows alignment looks good, sensation is intact, motor function is intact, DPP is 2+. There is no calf pain.     A: The patient is doing well without complications.    P:  The patient will have physical therapy and will be discharged when cleared by P.T.     Panchito Denise M.D.

## 2018-04-11 NOTE — OCCUPATIONAL THERAPY INITIAL EVALUATION ADULT - LIVES WITH, PROFILE
Pt lives alone in private house, with 2 steps to enter bed/bath on 1st floor +walk in shower. Pt can return home with daughter 3 steps to enter +handrail/alone

## 2018-04-11 NOTE — PROVIDER CONTACT NOTE (OTHER) - ACTION/TREATMENT ORDERED:
Cold packs applied. Tao PAK at bedside for assessment. No further interventions at this time. Will cont to monitor.

## 2018-04-12 ENCOUNTER — TRANSCRIPTION ENCOUNTER (OUTPATIENT)
Age: 78
End: 2018-04-12

## 2018-04-12 LAB
ANION GAP SERPL CALC-SCNC: 9 MMOL/L — SIGNIFICANT CHANGE UP (ref 5–17)
BUN SERPL-MCNC: 16 MG/DL — SIGNIFICANT CHANGE UP (ref 7–23)
CALCIUM SERPL-MCNC: 8.6 MG/DL — SIGNIFICANT CHANGE UP (ref 8.4–10.5)
CHLORIDE SERPL-SCNC: 97 MMOL/L — SIGNIFICANT CHANGE UP (ref 96–108)
CO2 SERPL-SCNC: 29 MMOL/L — SIGNIFICANT CHANGE UP (ref 22–31)
CREAT SERPL-MCNC: 0.76 MG/DL — SIGNIFICANT CHANGE UP (ref 0.5–1.3)
GLUCOSE SERPL-MCNC: 108 MG/DL — HIGH (ref 70–99)
HCT VFR BLD CALC: 36 % — SIGNIFICANT CHANGE UP (ref 34.5–45)
HGB BLD-MCNC: 11.9 G/DL — SIGNIFICANT CHANGE UP (ref 11.5–15.5)
MCHC RBC-ENTMCNC: 31.4 PG — SIGNIFICANT CHANGE UP (ref 27–34)
MCHC RBC-ENTMCNC: 33.1 GM/DL — SIGNIFICANT CHANGE UP (ref 32–36)
MCV RBC AUTO: 95 FL — SIGNIFICANT CHANGE UP (ref 80–100)
PLATELET # BLD AUTO: 224 K/UL — SIGNIFICANT CHANGE UP (ref 150–400)
POTASSIUM SERPL-MCNC: 3.3 MMOL/L — LOW (ref 3.5–5.3)
POTASSIUM SERPL-SCNC: 3.3 MMOL/L — LOW (ref 3.5–5.3)
RBC # BLD: 3.79 M/UL — LOW (ref 3.8–5.2)
RBC # FLD: 13.8 % — SIGNIFICANT CHANGE UP (ref 10.3–14.5)
SODIUM SERPL-SCNC: 135 MMOL/L — SIGNIFICANT CHANGE UP (ref 135–145)
SURGICAL PATHOLOGY STUDY: SIGNIFICANT CHANGE UP
WBC # BLD: 9.6 K/UL — SIGNIFICANT CHANGE UP (ref 3.8–10.5)
WBC # FLD AUTO: 9.6 K/UL — SIGNIFICANT CHANGE UP (ref 3.8–10.5)

## 2018-04-12 RX ORDER — TRAMADOL HYDROCHLORIDE 50 MG/1
1 TABLET ORAL
Qty: 0 | Refills: 0 | COMMUNITY
Start: 2018-04-12

## 2018-04-12 RX ORDER — POLYETHYLENE GLYCOL 3350 17 G/17G
17 POWDER, FOR SOLUTION ORAL
Qty: 0 | Refills: 0 | COMMUNITY
Start: 2018-04-12

## 2018-04-12 RX ORDER — ACETAMINOPHEN 500 MG
2 TABLET ORAL
Qty: 0 | Refills: 0 | COMMUNITY
Start: 2018-04-12

## 2018-04-12 RX ORDER — PANTOPRAZOLE SODIUM 20 MG/1
1 TABLET, DELAYED RELEASE ORAL
Qty: 0 | Refills: 0 | COMMUNITY
Start: 2018-04-12

## 2018-04-12 RX ORDER — OXYCODONE HYDROCHLORIDE 5 MG/1
1 TABLET ORAL
Qty: 0 | Refills: 0 | COMMUNITY
Start: 2018-04-12

## 2018-04-12 RX ORDER — DOCUSATE SODIUM 100 MG
1 CAPSULE ORAL
Qty: 0 | Refills: 0 | COMMUNITY
Start: 2018-04-12

## 2018-04-12 RX ORDER — POTASSIUM CHLORIDE 20 MEQ
20 PACKET (EA) ORAL
Qty: 0 | Refills: 0 | Status: COMPLETED | OUTPATIENT
Start: 2018-04-12 | End: 2018-04-12

## 2018-04-12 RX ORDER — ASPIRIN/CALCIUM CARB/MAGNESIUM 324 MG
1 TABLET ORAL
Qty: 0 | Refills: 0 | COMMUNITY
Start: 2018-04-12

## 2018-04-12 RX ORDER — IBUPROFEN 200 MG
2 TABLET ORAL
Qty: 0 | Refills: 0 | COMMUNITY

## 2018-04-12 RX ORDER — GABAPENTIN 400 MG/1
1 CAPSULE ORAL
Qty: 0 | Refills: 0 | COMMUNITY
Start: 2018-04-12

## 2018-04-12 RX ORDER — SENNA PLUS 8.6 MG/1
2 TABLET ORAL
Qty: 0 | Refills: 0 | COMMUNITY
Start: 2018-04-12

## 2018-04-12 RX ORDER — MAGNESIUM HYDROXIDE 400 MG/1
30 TABLET, CHEWABLE ORAL
Qty: 0 | Refills: 0 | COMMUNITY
Start: 2018-04-12

## 2018-04-12 RX ADMIN — Medication 20 MILLIEQUIVALENT(S): at 09:20

## 2018-04-12 RX ADMIN — Medication 20 MILLIEQUIVALENT(S): at 13:59

## 2018-04-12 RX ADMIN — GABAPENTIN 100 MILLIGRAM(S): 400 CAPSULE ORAL at 13:59

## 2018-04-12 RX ADMIN — Medication 20 MILLIEQUIVALENT(S): at 11:10

## 2018-04-12 RX ADMIN — TRAMADOL HYDROCHLORIDE 50 MILLIGRAM(S): 50 TABLET ORAL at 21:36

## 2018-04-12 RX ADMIN — POLYETHYLENE GLYCOL 3350 17 GRAM(S): 17 POWDER, FOR SOLUTION ORAL at 11:10

## 2018-04-12 RX ADMIN — Medication 650 MILLIGRAM(S): at 17:54

## 2018-04-12 RX ADMIN — PANTOPRAZOLE SODIUM 40 MILLIGRAM(S): 20 TABLET, DELAYED RELEASE ORAL at 11:10

## 2018-04-12 RX ADMIN — TRAMADOL HYDROCHLORIDE 50 MILLIGRAM(S): 50 TABLET ORAL at 06:30

## 2018-04-12 RX ADMIN — Medication 650 MILLIGRAM(S): at 11:40

## 2018-04-12 RX ADMIN — GABAPENTIN 100 MILLIGRAM(S): 400 CAPSULE ORAL at 05:54

## 2018-04-12 RX ADMIN — Medication 325 MILLIGRAM(S): at 05:54

## 2018-04-12 RX ADMIN — TRAMADOL HYDROCHLORIDE 50 MILLIGRAM(S): 50 TABLET ORAL at 14:00

## 2018-04-12 RX ADMIN — Medication 100 MILLIGRAM(S): at 21:34

## 2018-04-12 RX ADMIN — TRAMADOL HYDROCHLORIDE 50 MILLIGRAM(S): 50 TABLET ORAL at 05:54

## 2018-04-12 RX ADMIN — Medication 325 MILLIGRAM(S): at 17:14

## 2018-04-12 RX ADMIN — Medication 100 MILLIGRAM(S): at 13:59

## 2018-04-12 RX ADMIN — Medication 650 MILLIGRAM(S): at 11:10

## 2018-04-12 RX ADMIN — Medication 650 MILLIGRAM(S): at 17:15

## 2018-04-12 RX ADMIN — GABAPENTIN 100 MILLIGRAM(S): 400 CAPSULE ORAL at 21:34

## 2018-04-12 RX ADMIN — Medication 12.5 MILLIGRAM(S): at 05:54

## 2018-04-12 RX ADMIN — TRAMADOL HYDROCHLORIDE 50 MILLIGRAM(S): 50 TABLET ORAL at 21:34

## 2018-04-12 RX ADMIN — Medication 100 MILLIGRAM(S): at 05:54

## 2018-04-12 RX ADMIN — LOSARTAN POTASSIUM 50 MILLIGRAM(S): 100 TABLET, FILM COATED ORAL at 05:53

## 2018-04-12 RX ADMIN — TRAMADOL HYDROCHLORIDE 50 MILLIGRAM(S): 50 TABLET ORAL at 13:59

## 2018-04-12 RX ADMIN — Medication 30 MILLIGRAM(S): at 05:54

## 2018-04-12 NOTE — DISCHARGE NOTE ADULT - PATIENT PORTAL LINK FT
You can access the [facility] Patient Portal, offered by [facility], by registering with the following website: [email or web address]

## 2018-04-12 NOTE — PROGRESS NOTE ADULT - SUBJECTIVE AND OBJECTIVE BOX
YVONNE GUZMAN  [id number]  04-12-18 @ 08:18      S:s/p TKR. The patient reports pain control is ok..    O: The patient is alert. Examination shows alignment looks good, sensation is intact, motor function is intact, DPP is 2+. There is no calf pain.     A: The patient is doing well without complications.    P:  The patient will have physical therapy and will be discharged when cleared by P.T. Plan at this time is for discharge to rehab.    Panchito Denise M.D.

## 2018-04-12 NOTE — DISCHARGE NOTE ADULT - PLAN OF CARE
improve ambulation, reduce pain F/U with Dr Denise after discharge from rehab.  Keep dressing clean.  Remove dressing POD#14 (4/24/18) and d/c staples and sterile strip incision.  Physical therapy ambulation WBAT.  Ecotrin 325mg po 2x/day x 6 weeks for DVT prophylaxis. F/U with Dr Denise after discharge from rehab.  Keep dressing clean.  Remove dressing POD#14 (4/24/18) and d/c staples and apply steri strips to incision site.  Physical therapy ambulation WBAT.  Ecotrin 325mg po 2x/day x 6 weeks for DVT prophylaxis.

## 2018-04-12 NOTE — PROGRESS NOTE ADULT - SUBJECTIVE AND OBJECTIVE BOX
Patient seen and examined. Pain controlled. No acute events overnight    HEALTH ISSUES - PROBLEM Dx:  Osteoarthritis: Osteoarthritis        MEDICATIONS  (STANDING):  aspirin enteric coated 325 milliGRAM(s) Oral two times a day  ceFAZolin   IVPB 2000 milliGRAM(s) IV Intermittent once  docusate sodium 100 milliGRAM(s) Oral three times a day  gabapentin 100 milliGRAM(s) Oral every 8 hours  hydrochlorothiazide 12.5 milliGRAM(s) Oral daily  lactated ringers. 1000 milliLiter(s) IV Continuous <Continuous>  losartan 50 milliGRAM(s) Oral daily  morphine  - Injectable 4 milliGRAM(s) IV Push once  pantoprazole    Tablet 40 milliGRAM(s) Oral daily  polyethylene glycol 3350 17 Gram(s) Oral daily  thyroid 30 milliGRAM(s) Oral daily  traMADol 50 milliGRAM(s) Oral every 8 hours    Allergies    No Known Allergies    Intolerances                            11.1   9.27  )-----------( 228      ( 11 Apr 2018 07:28 )             34.1     11 Apr 2018 06:53    138    |  102    |  28     ----------------------------<  121    3.6     |  26     |  0.75     Ca    8.6        11 Apr 2018 06:53        Vital Signs Last 24 Hrs  T(C): 36.6 (04-12-18 @ 04:29), Max: 36.9 (04-11-18 @ 21:21)  T(F): 97.8 (04-12-18 @ 04:29), Max: 98.4 (04-11-18 @ 21:21)  HR: 70 (04-12-18 @ 04:29) (64 - 75)  BP: 128/71 (04-12-18 @ 04:29) (115/65 - 148/78)  BP(mean): --  RR: 18 (04-12-18 @ 04:29) (16 - 18)  SpO2: 94% (04-12-18 @ 04:29) (94% - 98%)    Physical Exam  Gen: NAD  RLE:   Dressing c/d/i  +ehl/fhl/ta/gs function  L2-S1 silt  Dp/pt pulse intact  No calf ttp  Compartments soft    A/P: 78y Female sp R TKA  Pain control  DVT ppx  PT/WBAT/OOB  FU labs  Ice/elevate  Medical management appreciated  Incentive spirometry  Dispo planning

## 2018-04-12 NOTE — DISCHARGE NOTE ADULT - CARE PROVIDER_API CALL
Panchito Denise), Orthopaedic Surgery  [address]  [place] [id number]  Phone: [phone]  Fax: [phone]

## 2018-04-12 NOTE — DISCHARGE NOTE ADULT - MEDICATION SUMMARY - MEDICATIONS TO TAKE
I will START or STAY ON the medications listed below when I get home from the hospital:    aspirin 325 mg oral delayed release tablet  -- 1 tab(s) by mouth 2 times a day x 6 weeks for DVT prophylaxis  -- Indication: For DVT prophylaxis    oxyCODONE 5 mg oral tablet  -- 1 tab(s) by mouth every 4 hours, As needed, Moderate Pain (4 - 6)  -- Indication: For Pain mgt    oxyCODONE 10 mg oral tablet  -- 1 tab(s) by mouth every 4 hours, As needed, Severe Pain (7 - 10)  -- Indication: For Pain mgt    traMADol 50 mg oral tablet  -- 1 tab(s) by mouth every 8 hours x total 2 weeks  -- Indication: For Pain mgt    acetaminophen 325 mg oral tablet  -- 2 tab(s) by mouth every 6 hours, As needed, For Temp greater than 38 C (100.4 F)  -- Indication: For temps    acetaminophen 325 mg oral tablet  -- 2 tab(s) by mouth every 6 hours, As needed, Mild Pain (1 - 3)  -- Indication: For Pain mgt    aluminum hydroxide-magnesium hydroxide 200 mg-200 mg/5 mL oral suspension  -- 30 milliliter(s) by mouth 4 times a day, As needed, Indigestion  -- Indication: For indigestion    magnesium hydroxide 8% oral suspension  -- 30 milliliter(s) by mouth once a day, As needed, Constipation  -- Indication: For constipation    gabapentin 100 mg oral capsule  -- 1 cap(s) by mouth every 8 hours x total 2 weeks  -- Indication: For Pain mgt    losartan-hydrochlorothiazide 50mg-12.5mg oral tablet  -- 1 tab(s) by mouth once a day  -- Indication: For HTN    docusate sodium 100 mg oral capsule  -- 1 cap(s) by mouth 3 times a day  -- Indication: For laxative    polyethylene glycol 3350 oral powder for reconstitution  -- 17 gram(s) by mouth once a day  -- Indication: For laxative    senna oral tablet  -- 2 tab(s) by mouth once a day (at bedtime), As needed, Constipation  -- Indication: For laxative    pantoprazole 40 mg oral delayed release tablet  -- 1 tab(s) by mouth once a day  -- Indication: For GI    [place] Thyroid 30 mg oral tablet  -- 1 tab(s) by mouth once a day  -- Indication: For Hypothyroidism    multivitamin  -- 1 tab(s) by mouth once a day - pt will hold to OR    -- Indication: For supplement    Calcium 600+D 600 mg-200 units oral tablet  -- orally once a day  -- Indication: For supplement    Vitamin C  -- 1 tab(s) by mouth once a day  -- Indication: For supplement

## 2018-04-12 NOTE — DISCHARGE NOTE ADULT - NS AS ACTIVITY OBS
Stairs allowed/Showering allowed/Walking-Outdoors allowed/No Heavy lifting/straining/Walking-Indoors allowed/Do not make important decisions/Do not drive or operate machinery

## 2018-04-12 NOTE — DISCHARGE NOTE ADULT - ADDITIONAL INSTRUCTIONS
F/U with Dr Denise after discharge from rehab.      Please f/u with your primary doctor after discharge from the hospital to discuss your hospital stay and any changes to your medication.

## 2018-04-12 NOTE — DISCHARGE NOTE ADULT - HOSPITAL COURSE
History of Present Illness		  77 yo female with h/o hypothyroidism and osteoarthritis of the right knee with c/o worsening right knee pain and difficulty walking. She is scheduled for right TKR on 4/10/2018.     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 CTA of brain 2/5/2018 revealed slight increase in A1A2 junction aneurysm, from 3x4mm to 6x4mm. Pt was instructed by neurosurgeon to follow up with repeat scan in six months (August 2018). Pt denies headache or visual disturbance.    Admitted for elective surgery on 4/10/18  S/P R TKR.  Tolerated procedure well.  Physical therapy for ambulation WBAT  PT recommended subacute rehab.  Will d/c to rehab when bed available.

## 2018-04-12 NOTE — DISCHARGE NOTE ADULT - MEDICATION SUMMARY - MEDICATIONS TO STOP TAKING
I will STOP taking the medications listed below when I get home from the hospital:    Fish Oil 1000 mg oral capsule  --  by mouth once a day - pt will hold to OR    red yeast rice  -- 1 cap(s) by mouth once a day - pt will hold to OR    TMG Supplement  -- 1 tab(s) by mouth once a day - pt will hold to OR    Advil 200 mg oral tablet  -- 2 tab(s) by mouth every 6 hours, As Needed - pt will hold to OR

## 2018-04-12 NOTE — DISCHARGE NOTE ADULT - CARE PLAN
Principal Discharge DX:	Osteoarthritis  Goal:	improve ambulation, reduce pain Principal Discharge DX:	Osteoarthritis  Goal:	improve ambulation, reduce pain  Assessment and plan of treatment:	F/U with Dr Denise after discharge from rehab.  Keep dressing clean.  Remove dressing POD#14 (4/24/18) and d/c staples and sterile strip incision.  Physical therapy ambulation WBAT.  Ecotrin 325mg po 2x/day x 6 weeks for DVT prophylaxis. Principal Discharge DX:	Osteoarthritis  Goal:	improve ambulation, reduce pain  Assessment and plan of treatment:	F/U with Dr Denise after discharge from rehab.  Keep dressing clean.  Remove dressing POD#14 (4/24/18) and d/c staples and apply steri strips to incision site.  Physical therapy ambulation WBAT.  Ecotrin 325mg po 2x/day x 6 weeks for DVT prophylaxis.

## 2018-04-13 VITALS — WEIGHT: 139.99 LBS

## 2018-04-13 LAB
ANION GAP SERPL CALC-SCNC: 12 MMOL/L — SIGNIFICANT CHANGE UP (ref 5–17)
BUN SERPL-MCNC: 18 MG/DL — SIGNIFICANT CHANGE UP (ref 7–23)
CALCIUM SERPL-MCNC: 8.6 MG/DL — SIGNIFICANT CHANGE UP (ref 8.4–10.5)
CHLORIDE SERPL-SCNC: 96 MMOL/L — SIGNIFICANT CHANGE UP (ref 96–108)
CO2 SERPL-SCNC: 26 MMOL/L — SIGNIFICANT CHANGE UP (ref 22–31)
CREAT SERPL-MCNC: 0.81 MG/DL — SIGNIFICANT CHANGE UP (ref 0.5–1.3)
GLUCOSE SERPL-MCNC: 124 MG/DL — HIGH (ref 70–99)
HCT VFR BLD CALC: 34.4 % — LOW (ref 34.5–45)
HGB BLD-MCNC: 11.9 G/DL — SIGNIFICANT CHANGE UP (ref 11.5–15.5)
MCHC RBC-ENTMCNC: 32.7 PG — SIGNIFICANT CHANGE UP (ref 27–34)
MCHC RBC-ENTMCNC: 34.5 GM/DL — SIGNIFICANT CHANGE UP (ref 32–36)
MCV RBC AUTO: 94.7 FL — SIGNIFICANT CHANGE UP (ref 80–100)
PLATELET # BLD AUTO: 222 K/UL — SIGNIFICANT CHANGE UP (ref 150–400)
POTASSIUM SERPL-MCNC: 4.1 MMOL/L — SIGNIFICANT CHANGE UP (ref 3.5–5.3)
POTASSIUM SERPL-SCNC: 4.1 MMOL/L — SIGNIFICANT CHANGE UP (ref 3.5–5.3)
RBC # BLD: 3.63 M/UL — LOW (ref 3.8–5.2)
RBC # FLD: 12.1 % — SIGNIFICANT CHANGE UP (ref 10.3–14.5)
SODIUM SERPL-SCNC: 134 MMOL/L — LOW (ref 135–145)
WBC # BLD: 7.1 K/UL — SIGNIFICANT CHANGE UP (ref 3.8–10.5)
WBC # FLD AUTO: 7.1 K/UL — SIGNIFICANT CHANGE UP (ref 3.8–10.5)

## 2018-04-13 PROCEDURE — 97110 THERAPEUTIC EXERCISES: CPT

## 2018-04-13 PROCEDURE — 73560 X-RAY EXAM OF KNEE 1 OR 2: CPT

## 2018-04-13 PROCEDURE — 97166 OT EVAL MOD COMPLEX 45 MIN: CPT

## 2018-04-13 PROCEDURE — C1713: CPT

## 2018-04-13 PROCEDURE — 82962 GLUCOSE BLOOD TEST: CPT

## 2018-04-13 PROCEDURE — C1776: CPT

## 2018-04-13 PROCEDURE — 80048 BASIC METABOLIC PNL TOTAL CA: CPT

## 2018-04-13 PROCEDURE — 97116 GAIT TRAINING THERAPY: CPT

## 2018-04-13 PROCEDURE — 85027 COMPLETE CBC AUTOMATED: CPT

## 2018-04-13 PROCEDURE — 97161 PT EVAL LOW COMPLEX 20 MIN: CPT

## 2018-04-13 PROCEDURE — 88305 TISSUE EXAM BY PATHOLOGIST: CPT

## 2018-04-13 PROCEDURE — 88311 DECALCIFY TISSUE: CPT

## 2018-04-13 RX ADMIN — Medication 100 MILLIGRAM(S): at 05:41

## 2018-04-13 RX ADMIN — POLYETHYLENE GLYCOL 3350 17 GRAM(S): 17 POWDER, FOR SOLUTION ORAL at 11:27

## 2018-04-13 RX ADMIN — LOSARTAN POTASSIUM 50 MILLIGRAM(S): 100 TABLET, FILM COATED ORAL at 05:41

## 2018-04-13 RX ADMIN — TRAMADOL HYDROCHLORIDE 50 MILLIGRAM(S): 50 TABLET ORAL at 05:42

## 2018-04-13 RX ADMIN — GABAPENTIN 100 MILLIGRAM(S): 400 CAPSULE ORAL at 05:41

## 2018-04-13 RX ADMIN — Medication 325 MILLIGRAM(S): at 05:41

## 2018-04-13 RX ADMIN — PANTOPRAZOLE SODIUM 40 MILLIGRAM(S): 20 TABLET, DELAYED RELEASE ORAL at 11:27

## 2018-04-13 RX ADMIN — Medication 12.5 MILLIGRAM(S): at 05:41

## 2018-04-13 RX ADMIN — TRAMADOL HYDROCHLORIDE 50 MILLIGRAM(S): 50 TABLET ORAL at 05:41

## 2018-04-13 RX ADMIN — Medication 30 MILLIGRAM(S): at 05:41

## 2018-04-13 NOTE — PROGRESS NOTE ADULT - SUBJECTIVE AND OBJECTIVE BOX
Patient seen and examined. Pain controlled. No acute events overnight    HEALTH ISSUES - PROBLEM Dx:  Osteoarthritis: Osteoarthritis        MEDICATIONS  (STANDING):  aspirin enteric coated 325 milliGRAM(s) Oral two times a day  ceFAZolin   IVPB 2000 milliGRAM(s) IV Intermittent once  docusate sodium 100 milliGRAM(s) Oral three times a day  gabapentin 100 milliGRAM(s) Oral every 8 hours  hydrochlorothiazide 12.5 milliGRAM(s) Oral daily  lactated ringers. 1000 milliLiter(s) IV Continuous <Continuous>  losartan 50 milliGRAM(s) Oral daily  morphine  - Injectable 4 milliGRAM(s) IV Push once  pantoprazole    Tablet 40 milliGRAM(s) Oral daily  polyethylene glycol 3350 17 Gram(s) Oral daily  thyroid 30 milliGRAM(s) Oral daily  traMADol 50 milliGRAM(s) Oral every 8 hours    Allergies    No Known Allergies    Intolerances                            11.9   9.60  )-----------( 224      ( 12 Apr 2018 07:47 )             36.0     12 Apr 2018 07:05    135    |  97     |  16     ----------------------------<  108    3.3     |  29     |  0.76     Ca    8.6        12 Apr 2018 07:05        Vital Signs Last 24 Hrs  T(C): 37.2 (04-13-18 @ 05:36), Max: 37.2 (04-13-18 @ 05:36)  T(F): 98.9 (04-13-18 @ 05:36), Max: 98.9 (04-13-18 @ 05:36)  HR: 91 (04-13-18 @ 05:36) (68 - 91)  BP: 133/77 (04-13-18 @ 05:36) (99/60 - 133/77)  BP(mean): --  RR: 18 (04-13-18 @ 05:36) (18 - 18)  SpO2: 94% (04-13-18 @ 05:36) (93% - 98%)    Physical Exam  Gen: NAD  RLE:  Dressing c/d/i  +ehl/fhl/ta/gs function  L2-S1 silt  Dp/pt pulse intact  No calf ttp  Compartments soft    A/P: 78y Female sp R TKA  Pain control  DVT ppx  PT/WBAT/OOB  FU labs  Ice/elevate  Medical management appreciated  Incentive spirometry  Dispo planning

## 2018-08-23 ENCOUNTER — OTHER (OUTPATIENT)
Age: 78
End: 2018-08-23

## 2018-09-17 PROBLEM — E03.9 HYPOTHYROIDISM, UNSPECIFIED: Chronic | Status: ACTIVE | Noted: 2018-03-26

## 2018-09-17 PROBLEM — M19.90 UNSPECIFIED OSTEOARTHRITIS, UNSPECIFIED SITE: Chronic | Status: ACTIVE | Noted: 2018-03-26

## 2018-09-19 ENCOUNTER — FORM ENCOUNTER (OUTPATIENT)
Age: 78
End: 2018-09-19

## 2018-09-20 ENCOUNTER — OUTPATIENT (OUTPATIENT)
Dept: OUTPATIENT SERVICES | Facility: HOSPITAL | Age: 78
LOS: 1 days | End: 2018-09-20
Payer: MEDICARE

## 2018-09-20 ENCOUNTER — APPOINTMENT (OUTPATIENT)
Dept: CT IMAGING | Facility: HOSPITAL | Age: 78
End: 2018-09-20

## 2018-09-20 DIAGNOSIS — Z90.49 ACQUIRED ABSENCE OF OTHER SPECIFIED PARTS OF DIGESTIVE TRACT: Chronic | ICD-10-CM

## 2018-09-20 DIAGNOSIS — Z98.89 OTHER SPECIFIED POSTPROCEDURAL STATES: Chronic | ICD-10-CM

## 2018-09-20 DIAGNOSIS — I67.1 CEREBRAL ANEURYSM, NONRUPTURED: Chronic | ICD-10-CM

## 2018-09-20 DIAGNOSIS — I67.1 CEREBRAL ANEURYSM, NONRUPTURED: ICD-10-CM

## 2018-09-20 DIAGNOSIS — Z90.710 ACQUIRED ABSENCE OF BOTH CERVIX AND UTERUS: Chronic | ICD-10-CM

## 2018-09-20 PROCEDURE — 70496 CT ANGIOGRAPHY HEAD: CPT | Mod: 26

## 2018-09-20 PROCEDURE — 70496 CT ANGIOGRAPHY HEAD: CPT

## 2018-09-21 ENCOUNTER — OTHER (OUTPATIENT)
Age: 78
End: 2018-09-21

## 2018-09-24 ENCOUNTER — OTHER (OUTPATIENT)
Age: 78
End: 2018-09-24

## 2019-07-19 ENCOUNTER — TRANSCRIPTION ENCOUNTER (OUTPATIENT)
Age: 79
End: 2019-07-19

## 2019-11-20 ENCOUNTER — APPOINTMENT (OUTPATIENT)
Dept: CT IMAGING | Facility: CLINIC | Age: 79
End: 2019-11-20
Payer: MEDICARE

## 2019-11-20 ENCOUNTER — OUTPATIENT (OUTPATIENT)
Dept: OUTPATIENT SERVICES | Facility: HOSPITAL | Age: 79
LOS: 1 days | End: 2019-11-20
Payer: MEDICARE

## 2019-11-20 DIAGNOSIS — I67.1 CEREBRAL ANEURYSM, NONRUPTURED: Chronic | ICD-10-CM

## 2019-11-20 DIAGNOSIS — Z90.49 ACQUIRED ABSENCE OF OTHER SPECIFIED PARTS OF DIGESTIVE TRACT: Chronic | ICD-10-CM

## 2019-11-20 DIAGNOSIS — Z90.710 ACQUIRED ABSENCE OF BOTH CERVIX AND UTERUS: Chronic | ICD-10-CM

## 2019-11-20 DIAGNOSIS — Z98.89 OTHER SPECIFIED POSTPROCEDURAL STATES: Chronic | ICD-10-CM

## 2019-11-20 DIAGNOSIS — I67.1 CEREBRAL ANEURYSM, NONRUPTURED: ICD-10-CM

## 2019-11-20 PROCEDURE — 70496 CT ANGIOGRAPHY HEAD: CPT

## 2019-11-20 PROCEDURE — 82565 ASSAY OF CREATININE: CPT

## 2019-11-20 PROCEDURE — 70496 CT ANGIOGRAPHY HEAD: CPT | Mod: 26

## 2020-08-26 ENCOUNTER — APPOINTMENT (OUTPATIENT)
Dept: NEUROSURGERY | Facility: CLINIC | Age: 80
End: 2020-08-26
Payer: MEDICARE

## 2020-08-26 VITALS
HEIGHT: 64 IN | BODY MASS INDEX: 22.2 KG/M2 | TEMPERATURE: 98.3 F | HEART RATE: 49 BPM | DIASTOLIC BLOOD PRESSURE: 75 MMHG | SYSTOLIC BLOOD PRESSURE: 133 MMHG | WEIGHT: 130 LBS | OXYGEN SATURATION: 98 %

## 2020-08-26 DIAGNOSIS — I67.1 CEREBRAL ANEURYSM, NONRUPTURED: ICD-10-CM

## 2020-08-26 DIAGNOSIS — Z86.79 PERSONAL HISTORY OF OTHER DISEASES OF THE CIRCULATORY SYSTEM: ICD-10-CM

## 2020-08-26 PROCEDURE — 99204 OFFICE O/P NEW MOD 45 MIN: CPT

## 2021-01-06 NOTE — HISTORY OF PRESENT ILLNESS
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 She has a history of an unruptured acomm artery aneurysm clipped in 1994. CTA shows stable 3-5 mm left A1A2 aneurysm remnant.This lesion has been watched for 6 years and we will continue to watch on a yearly basis. Pt is a non smoker and there is no family history of aneurysm. Pt denies c/o headaches, vision or gait disturbance or any neurological symptoms. She is an active 81 yo. She is having some mild memory issues and minor cognitive decline. \par Plan f/u in 6 months with CTA \par

## 2021-01-06 NOTE — ASSESSMENT
[FreeTextEntry1] : 2020\par \par \par \par Re:	Gely Alexis \par :	1940\par \par The patient is an 80-year-old who in  underwent a left craniotomy by myself for an unruptured anterior communicating artery aneurysm.  She did well.  She was then lost to follow-up and reappeared 20 years later.  A cerebral angiogram was performed by \par Dr. Can, which showed a small remnant near the clip, and according to his angiogram at that time, the remnant measured 2.7 x 3.8 x 4.0.  She was followed for this remnant, and we felt there was no indication for surgery due to her age, and over the ensuing years from , , , and , the remnant was followed noninvasively and it measured 4-5 mm.  I never felt it should be operated on.  \par \par She has been doing well and has had no acute medical problems.  She is on medication for hypertension.  She has had surgery on her right knee, a left craniotomy, cholecystectomy, appendectomy, and vaginal hysterectomy.  She has no allergies.  Social history and family history are negative.  \par \par She is here with her daughter today, and the current issue is not referable to the aneurysm but is referable to cognitive decline and cognitive changes.  Dr. Cristiano Lomas is her neurologist and 2 years ago sent her to a neuropsychologist.  The daughter claims she “failed” all the tests.  Today in the office, she is unable to state who the president is, unable to state who is running for president, unable to say what town we are in, and unable to say how many children she has.  She does intermittently have lucidity, but she is quite confused when specific questions are asked.  Due to the cognitive issues, the daughter sought an opinion from Dr. Christopher, who felt the patient should have a cerebral angiogram.  \par \par The patient is neurologically intact today.  She is 80 years old, and it is my opinion that given her cognitive decline and her age and the stability of this remnant in an unruptured aneurysm, the patient should be managed conservatively.  I do not suggest angiography, and I will see the patient in 6 months’ time with a follow-up CTA Brain.  All the rationale for this was explained in great detail to the daughter.\par \par \par \par Juno Rodríguez M.D., F.A.C.S.\par [facility]\par \par \par

## 2021-01-06 NOTE — PHYSICAL EXAM
[General Appearance - In No Acute Distress] : in no acute distress [General Appearance - Alert] : alert [Affect] : the affect was normal [Impaired Insight] : insight and judgment were intact [Oriented To Time, Place, And Person] : oriented to person, place, and time [Place] : oriented to place [Person] : oriented to person [Time] : oriented to time [Span Intact] : the attention span was normal [Concentration Intact] : normal concentrating ability [Past History] : adequate knowledge of personal past history [Comprehension] : comprehension intact [Current Events] : adequate knowledge of current events [Cranial Nerves Oculomotor (III)] : extraocular motion intact [Vocabulary] : adequate range of vocabulary [Cranial Nerves Trigeminal (V)] : facial sensation intact symmetrically [Cranial Nerves Vestibulocochlear (VIII)] : hearing was intact bilaterally [Cranial Nerves Facial (VII)] : face symmetrical [Motor Tone] : muscle tone was normal in all four extremities [Cranial Nerves Glossopharyngeal (IX)] : tongue and palate midline [Cranial Nerves Hypoglossal (XII)] : there was no tongue deviation with protrusion [Cranial Nerves Accessory (XI - Cranial And Spinal)] : head turning and shoulder shrug symmetric [No Muscle Atrophy] : normal bulk in all four extremities [Motor Strength] : muscle strength was normal in all four extremities [Sensation Tactile Decrease] : light touch was intact [Balance] : balance was intact [Extraocular Movements] : extraocular movements were intact [] : no respiratory distress [Neck Appearance] : the appearance of the neck was normal [Outer Ear] : the ears and nose were normal in appearance [Exaggerated Use Of Accessory Muscles For Inspiration] : no accessory muscle use [Respiration, Rhythm And Depth] : normal respiratory rhythm and effort [Heart Rate And Rhythm] : heart rate was normal and rhythm regular [Involuntary Movements] : no involuntary movements were seen [Abnormal Walk] : normal gait [Skin Turgor] : normal skin turgor [Skin Color & Pigmentation] : normal skin color and pigmentation [Short Term Intact] : short term memory impaired [Remote Intact] : remote memory impaired [Fluency] : fluency not intact [Past-pointing] : there was no past-pointing [Tremor] : no tremor present

## 2021-02-03 ENCOUNTER — APPOINTMENT (OUTPATIENT)
Dept: NEUROSURGERY | Facility: CLINIC | Age: 81
End: 2021-02-03

## 2021-04-08 NOTE — HISTORY OF PRESENT ILLNESS
[FreeTextEntry1] : Spoke to pt's daughter who stated patient is stable. 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 Daughter will mail disc for review and follow up with phone call as per ronald.

## 2022-07-07 NOTE — H&P PST ADULT - NSANTHAGERD_ENT_A_CORE
[Fatigue] : fatigue [Vision Problems] : vision problems [Hearing Loss] : hearing loss [Joint Pain] : joint pain [Joint Stiffness] : joint stiffness [Negative] : Heme/Lymph [Incontinence] : no incontinence [Nocturia] : no nocturia [FreeTextEntry2] : Lying in bed.   [de-identified] : mild to mod dementia Yes